# Patient Record
Sex: MALE | Race: WHITE | NOT HISPANIC OR LATINO | URBAN - NONMETROPOLITAN AREA
[De-identification: names, ages, dates, MRNs, and addresses within clinical notes are randomized per-mention and may not be internally consistent; named-entity substitution may affect disease eponyms.]

---

## 2024-05-08 ENCOUNTER — OFFICE (OUTPATIENT)
Dept: URBAN - NONMETROPOLITAN AREA CLINIC 5 | Facility: CLINIC | Age: 84
End: 2024-05-08

## 2024-05-08 VITALS
HEIGHT: 69 IN | RESPIRATION RATE: 16 BRPM | SYSTOLIC BLOOD PRESSURE: 135 MMHG | WEIGHT: 218 LBS | HEART RATE: 57 BPM | DIASTOLIC BLOOD PRESSURE: 74 MMHG

## 2024-05-08 DIAGNOSIS — K43.9 VENTRAL HERNIA WITHOUT OBSTRUCTION OR GANGRENE: ICD-10-CM

## 2024-05-08 DIAGNOSIS — K56.609 UNSPECIFIED INTESTINAL OBSTRUCTION, UNSPECIFIED AS TO PARTIA: ICD-10-CM

## 2024-05-08 DIAGNOSIS — R93.5 ABNORMAL FINDINGS ON DIAGNOSTIC IMAGING OF OTHER ABDOMINAL R: ICD-10-CM

## 2024-05-08 PROCEDURE — 99204 OFFICE O/P NEW MOD 45 MIN: CPT | Performed by: INTERNAL MEDICINE

## 2024-05-08 NOTE — SERVICEHPINOTES
Yonatan Sheikh   is a   82 yo  male   whom I am seeing as a new patient today. the patient is an 83-year-old white male who was recently admitted to [facility] in [place] for evaluation of a small-bowel obstruction.  The patient recalls eating a large steak dinner on the evening of the day of his admission to the hospital.  He states that he ate more than usual and that approximately 8-9 p.m. that night he began having fairly severe diffuse abdominal pain followed by nausea and vomiting.  He therefore went to the emergency room at [facility] in [facility] and was transferred to [place] for surgical evaluation.  His CT scan of the abdomen and pelvis done on 04/20/2024 revealed a small bowel obstruction with focal area of narrowing and a 1.5 cm hyperdensity arising from the wall of the small bowel in the right abdomen, possibly obstructing lesion.  Also a 1.5 cm left renal lesion was seen and trace amount of ascites.  He was evaluated by surgery and his symptoms quickly resolved overnight having a bowel movement and with complete resolution of his pain.  He was hospitalized just 1 night.  He has not had any further pain since then.  He does have chronic constipation and normally has a bowel movement every 2-3 days.  He is currently taking combination of MiraLax and Colace.  He has not seen any blood in the stool.  He is never undergone a screening colonoscopy.  There is a family history of colon cancer in his maternal grandmother and a maternal aunt.  He has no weight loss.  He is here today with his daughter.  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  [facility] is [facility] in [place].

## 2024-05-23 ENCOUNTER — ON CAMPUS - OUTPATIENT (OUTPATIENT)
Dept: URBAN - NONMETROPOLITAN AREA HOSPITAL 17 | Facility: HOSPITAL | Age: 84
End: 2024-05-23
Payer: COMMERCIAL

## 2024-05-23 VITALS — HEIGHT: 69 IN

## 2024-05-23 DIAGNOSIS — D12.2 BENIGN NEOPLASM OF ASCENDING COLON: ICD-10-CM

## 2024-05-23 DIAGNOSIS — D12.5 BENIGN NEOPLASM OF SIGMOID COLON: ICD-10-CM

## 2024-05-23 DIAGNOSIS — D12.4 BENIGN NEOPLASM OF DESCENDING COLON: ICD-10-CM

## 2024-05-23 DIAGNOSIS — D12.0 BENIGN NEOPLASM OF CECUM: ICD-10-CM

## 2024-05-23 DIAGNOSIS — D12.3 BENIGN NEOPLASM OF TRANSVERSE COLON: ICD-10-CM

## 2024-05-23 DIAGNOSIS — R93.5 ABNORMAL FINDINGS ON DIAGNOSTIC IMAGING OF OTHER ABDOMINAL R: ICD-10-CM

## 2024-05-23 PROBLEM — K63.5 POLYP OF COLON: Status: ACTIVE | Noted: 2024-05-23

## 2024-05-23 PROCEDURE — 45385 COLONOSCOPY W/LESION REMOVAL: CPT | Performed by: INTERNAL MEDICINE

## 2024-10-21 ENCOUNTER — OFFICE (OUTPATIENT)
Dept: URBAN - METROPOLITAN AREA PATHOLOGY 12 | Facility: PATHOLOGY | Age: 84
End: 2024-10-21
Payer: MEDICARE

## 2024-10-21 DIAGNOSIS — K86.89 OTHER SPECIFIED DISEASES OF PANCREAS: ICD-10-CM

## 2024-10-21 PROBLEM — R93.3 ABNORMAL FINDINGS ON DIAGNOSTIC IMAGING OF OTHER PARTS OF DI: Status: ACTIVE | Noted: 2024-10-21

## 2024-10-21 PROBLEM — R93.3 ABNORMAL RADIOGRAPHIC IMAGING OF PANCREAS: Status: ACTIVE | Noted: 2024-10-21

## 2024-10-21 PROCEDURE — 88305 TISSUE EXAM BY PATHOLOGIST: CPT | Performed by: STUDENT IN AN ORGANIZED HEALTH CARE EDUCATION/TRAINING PROGRAM

## 2024-10-21 PROCEDURE — 88341 IMHCHEM/IMCYTCHM EA ADD ANTB: CPT | Performed by: STUDENT IN AN ORGANIZED HEALTH CARE EDUCATION/TRAINING PROGRAM

## 2024-10-21 PROCEDURE — 88342 IMHCHEM/IMCYTCHM 1ST ANTB: CPT | Performed by: STUDENT IN AN ORGANIZED HEALTH CARE EDUCATION/TRAINING PROGRAM
